# Patient Record
Sex: FEMALE | Race: WHITE | NOT HISPANIC OR LATINO | Employment: OTHER | URBAN - METROPOLITAN AREA
[De-identification: names, ages, dates, MRNs, and addresses within clinical notes are randomized per-mention and may not be internally consistent; named-entity substitution may affect disease eponyms.]

---

## 2023-10-10 ENCOUNTER — APPOINTMENT (OUTPATIENT)
Dept: PULMONOLOGY | Facility: CLINIC | Age: 73
End: 2023-10-10
Payer: MEDICARE

## 2023-11-14 ENCOUNTER — APPOINTMENT (OUTPATIENT)
Dept: PULMONOLOGY | Facility: CLINIC | Age: 73
End: 2023-11-14
Payer: MEDICARE

## 2023-11-28 ENCOUNTER — APPOINTMENT (OUTPATIENT)
Dept: PULMONOLOGY | Facility: CLINIC | Age: 73
End: 2023-11-28
Payer: MEDICARE

## 2024-02-22 ENCOUNTER — TELEPHONE (OUTPATIENT)
Dept: CARDIOLOGY | Facility: HOSPITAL | Age: 74
End: 2024-02-22
Payer: MEDICARE

## 2024-04-17 ENCOUNTER — APPOINTMENT (OUTPATIENT)
Dept: CARDIOLOGY | Facility: HOSPITAL | Age: 74
End: 2024-04-17
Payer: COMMERCIAL

## 2024-04-19 ENCOUNTER — APPOINTMENT (OUTPATIENT)
Dept: CARDIOLOGY | Facility: HOSPITAL | Age: 74
End: 2024-04-19
Payer: MEDICARE

## 2024-04-29 ENCOUNTER — APPOINTMENT (OUTPATIENT)
Dept: CARDIOLOGY | Facility: HOSPITAL | Age: 74
End: 2024-04-29
Payer: MEDICARE

## 2024-05-15 ENCOUNTER — OFFICE VISIT (OUTPATIENT)
Dept: CARDIOLOGY | Facility: HOSPITAL | Age: 74
End: 2024-05-15
Payer: MEDICARE

## 2024-05-15 VITALS
WEIGHT: 132.5 LBS | SYSTOLIC BLOOD PRESSURE: 120 MMHG | HEART RATE: 86 BPM | BODY MASS INDEX: 22.74 KG/M2 | DIASTOLIC BLOOD PRESSURE: 71 MMHG

## 2024-05-15 DIAGNOSIS — R06.02 SHORTNESS OF BREATH: ICD-10-CM

## 2024-05-15 DIAGNOSIS — I10 HYPERTENSION, UNSPECIFIED TYPE: ICD-10-CM

## 2024-05-15 DIAGNOSIS — E78.5 HYPERLIPIDEMIA, UNSPECIFIED HYPERLIPIDEMIA TYPE: Primary | ICD-10-CM

## 2024-05-15 PROCEDURE — 1159F MED LIST DOCD IN RCRD: CPT | Performed by: NURSE PRACTITIONER

## 2024-05-15 PROCEDURE — 1036F TOBACCO NON-USER: CPT | Performed by: NURSE PRACTITIONER

## 2024-05-15 PROCEDURE — 3078F DIAST BP <80 MM HG: CPT | Performed by: NURSE PRACTITIONER

## 2024-05-15 PROCEDURE — 99213 OFFICE O/P EST LOW 20 MIN: CPT | Performed by: NURSE PRACTITIONER

## 2024-05-15 PROCEDURE — 1157F ADVNC CARE PLAN IN RCRD: CPT | Performed by: NURSE PRACTITIONER

## 2024-05-15 PROCEDURE — 3074F SYST BP LT 130 MM HG: CPT | Performed by: NURSE PRACTITIONER

## 2024-05-15 RX ORDER — FLUTICASONE PROPIONATE AND SALMETEROL 250; 50 UG/1; UG/1
1 POWDER RESPIRATORY (INHALATION) EVERY 12 HOURS
COMMUNITY

## 2024-05-15 RX ORDER — GABAPENTIN 600 MG/1
600 TABLET ORAL 3 TIMES DAILY
COMMUNITY
Start: 2024-05-23 | End: 2024-08-21

## 2024-05-15 RX ORDER — IPRATROPIUM BROMIDE AND ALBUTEROL 20; 100 UG/1; UG/1
1 SPRAY, METERED RESPIRATORY (INHALATION) 4 TIMES DAILY
COMMUNITY
Start: 2021-03-16

## 2024-05-15 RX ORDER — ATORVASTATIN CALCIUM 80 MG/1
80 TABLET, FILM COATED ORAL DAILY
COMMUNITY
Start: 2023-10-19

## 2024-05-15 RX ORDER — LEVOTHYROXINE SODIUM 150 UG/1
1 TABLET ORAL
COMMUNITY
Start: 2020-10-30

## 2024-05-15 RX ORDER — CHLORZOXAZONE 500 MG/1
250-500 TABLET ORAL EVERY 8 HOURS PRN
COMMUNITY
Start: 2024-05-13 | End: 2024-08-11

## 2024-05-15 RX ORDER — RIZATRIPTAN BENZOATE 5 MG/1
5 TABLET ORAL ONCE AS NEEDED
COMMUNITY
Start: 2021-07-13

## 2024-05-15 RX ORDER — CANDESARTAN 16 MG/1
1 TABLET ORAL DAILY
COMMUNITY
Start: 2021-03-16

## 2024-05-15 RX ORDER — BUSPIRONE HYDROCHLORIDE 15 MG/1
15 TABLET ORAL 3 TIMES DAILY
COMMUNITY

## 2024-05-15 RX ORDER — FUROSEMIDE 40 MG/1
40 TABLET ORAL DAILY PRN
COMMUNITY

## 2024-05-15 ASSESSMENT — ENCOUNTER SYMPTOMS
GASTROINTESTINAL NEGATIVE: 1
DEPRESSION: 0
DYSPNEA ON EXERTION: 1
CONSTITUTIONAL NEGATIVE: 1
HEMATOLOGIC/LYMPHATIC NEGATIVE: 1
SHORTNESS OF BREATH: 1
NEUROLOGICAL NEGATIVE: 1
PSYCHIATRIC NEGATIVE: 1
MUSCLE CRAMPS: 1
ENDOCRINE NEGATIVE: 1
EYES NEGATIVE: 1

## 2024-05-15 NOTE — PROGRESS NOTES
Referred by Dr. Valdovinos ref. provider found for No chief complaint on file.     History Of Present Illness:    Luis Armando Goss is a very pleasant 73 year old female with a history of HTN, HLD and COPD, she is here for a follow up visit. The patient is seen in collaboration with Dr. Aguilar. Mrs. Goss was in the hospital in February, she had several falls and had back surgery. Complains of right leg pain, following up with pain management. She complains of shortness of breath, pulse ox has been low at home. She has had dizziness with any exertion. Complains of chest pain, pain in her neck.     Review of Systems   Constitutional: Negative.   HENT: Negative.     Eyes: Negative.    Cardiovascular:  Positive for chest pain and dyspnea on exertion.   Respiratory:  Positive for shortness of breath.    Endocrine: Negative.    Hematologic/Lymphatic: Negative.    Skin: Negative.    Musculoskeletal:  Positive for muscle cramps and muscle weakness.   Gastrointestinal: Negative.    Neurological: Negative.    Psychiatric/Behavioral: Negative.        Past Medical History:  She has no past medical history on file.    Past Surgical History:  She has a past surgical history that includes Appendectomy (07/06/2017); Back surgery (07/06/2017); CT angio head w and wo IV contrast (1/19/2023); and CT angio neck (1/19/2023).      Social History:  She reports that she has quit smoking. Her smoking use included cigarettes. She has never used smokeless tobacco. She reports that she does not currently use alcohol. She reports that she does not use drugs.    Family History:  No family history on file.     Allergies:  Patient has no allergy information on record.    Outpatient Medications:  No current outpatient medications     Last Recorded Vitals:  There were no vitals filed for this visit.    Physical Exam:  Physical Exam  Vitals reviewed.   HENT:      Head: Normocephalic.      Nose: Nose normal.   Eyes:      Pupils: Pupils are equal, round, and  reactive to light.   Cardiovascular:      Rate and Rhythm: Normal rate and regular rhythm. 3/6 VOLODYMYR   Pulmonary:      Effort: Pulmonary effort is normal.      Breath sounds: Normal breath sounds.   Abdominal:      General: Abdomen is flat.      Palpations: Abdomen is soft.   Musculoskeletal:         General: Normal range of motion.      Cervical back: Normal range of motion.   Skin:     General: Skin is warm and dry.   Neurological:      General: No focal deficit present.      Mental Status: He is alert and oriented to person, place, and time.   Psychiatric:         Mood and Affect: Mood normal.            Last Labs:  CBC -  Lab Results   Component Value Date    WBC 8.6 06/21/2023    HGB 11.9 (L) 06/21/2023    HCT 38.7 06/21/2023    MCV 86 06/21/2023     06/21/2023       CMP -  Lab Results   Component Value Date    CALCIUM 9.1 06/21/2023    PHOS 2.0 (L) 01/30/2023    PROT 5.6 (L) 01/27/2023    ALBUMIN 3.2 (L) 01/30/2023    ALBUMIN 2.8 (L) 09/27/2021    AST 27 01/27/2023    ALT 16 01/27/2023    ALKPHOS 29 (L) 01/27/2023    BILITOT 0.4 01/27/2023       LIPID PANEL -   Lab Results   Component Value Date    CHOL 85 01/19/2023    TRIG 87 01/19/2023    HDL 29.6 (A) 01/19/2023    CHHDL 2.9 01/19/2023    LDLF 38 01/19/2023    VLDL 17 01/19/2023       RENAL FUNCTION PANEL -   Lab Results   Component Value Date    GLUCOSE 92 06/21/2023     06/21/2023    K 4.1 06/21/2023     06/21/2023    CO2 27 06/21/2023    ANIONGAP 10 06/21/2023    BUN 18 06/21/2023    CREATININE 0.99 06/21/2023    CALCIUM 9.1 06/21/2023    PHOS 2.0 (L) 01/30/2023    ALBUMIN 3.2 (L) 01/30/2023    ALBUMIN 2.8 (L) 09/27/2021        Lab Results   Component Value Date    BNP 67 01/17/2023    HGBA1C 5.9 (H) 02/10/2024       Last Cardiology Tests:  ECG:    Echo:  Echocardiogram 1/18/2023  1. Left ventricular systolic function is normal with a 60-65% estimated ejection fraction.  2. Spectral Doppler shows an impaired relaxation pattern of left  ventricular diastolic filling.  3. Mild to moderate aortic valve stenosis.  4. Moderate aortic valve regurgitation.  5. There is plaque visualized in the ascending aorta.  6. The tricuspid regurgitant velocity is 3.18 m/s, and with an estimated right atrial pressure of 3 mmHg, the estimated pulmonary artery pressure is moderately elevated with the RVSP at 43.4 mmHg.    Echo 10/26/17:   1. The left ventricular systolic function is low normal with a 55% estimated  ejection fraction.   2. Spectral Doppler shows an impaired relaxation pattern of left ventricular  diastolic filling.   3. Mild aortic valve stenosis.   4. There is moderate aortic valve regurgitation.   5. There is mild dilatation of the ascending aorta (3.9cm).   6. There is mild mitral, tricuspid, and pulmonic regurgitation   Ejection Fractions:  LVEF 65%  Cath:  Cardiac cath 11/16/2018   1. Left main: no significant angiograhpic disease.   2. LAD: minimal diffuse luminal irregularities.   3. LCx: 30% proximal stenosis.   4. RCA: 50% tubular mid-vessel stenosis.   5. LVEDP 11mmHg, no aortic stenosis on LV-AO gradient.   6. 2+ aortic regurgitation by aortography.  Stress Test:  Nuclear Stress Test 07/20/2023  1. Normal myocardial perfusion study without evidence of ischemia or  prior infarction.  2. The left ventricle is normal in size.  3. Normal LV wall motion with an LV EF estimated at greater than 65%.     Cardiac Imaging:  CTA 7/26/2018:  1. Stable mild fusiform aneurysmal dilatation of the ascending  thoracic aorta measuring up to 4.2 cm. Rest of thoracic aorta is  normal in course and caliber no evidence of acute aortic pathology.  2. 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  3. Redemonstration of severe centrilobular emphysematous changes.  4. Stable appearance of multiple small bilateral noncalcified  pulmonary nodules/nodular densities. Of note, the  previously seen 8  mm lingular nodular density is also unchanged and likely felt to  represent scarring.    Assessment/Plan   Very pleasant 73-year-old female with hypertension, dyslipidemia, mild ascending aortic aneurysm/mod AI ,COPD, hypothyroidism, she complains of exertional dyspnea and dizziness. Nuclear stress test was negative for ischemia. She is euvolemic on exam.  She will be follow up with pulmonary, pulse ox was low normal today. Heart rate and blood pressure are well controlled today.      Plan   -call with any questions   -follow up with pulmonary   -daily weights   -monitor sodium and fluid intake   -take Lasix as needed   -follow up in October         Ashley Hurtado, APRN-CNP

## 2024-05-15 NOTE — PATIENT INSTRUCTIONS
CALL WITH ANY QUESTIONS   NO MEDICATION CHANGES   FOLLOW UP IN OCTOBER   FOLLOW UP WITH PULMONARY

## 2024-10-15 ENCOUNTER — APPOINTMENT (OUTPATIENT)
Dept: CARDIOLOGY | Facility: CLINIC | Age: 74
End: 2024-10-15
Payer: MEDICARE

## 2025-04-22 ENCOUNTER — NURSING HOME VISIT (OUTPATIENT)
Dept: POST ACUTE CARE | Facility: EXTERNAL LOCATION | Age: 75
End: 2025-04-22
Payer: MEDICARE

## 2025-04-22 DIAGNOSIS — I10 PRIMARY HYPERTENSION: ICD-10-CM

## 2025-04-22 DIAGNOSIS — J44.9 CHRONIC OBSTRUCTIVE PULMONARY DISEASE, UNSPECIFIED COPD TYPE (MULTI): ICD-10-CM

## 2025-04-22 DIAGNOSIS — S42.355D CLOSED NONDISPLACED COMMINUTED FRACTURE OF SHAFT OF LEFT HUMERUS WITH ROUTINE HEALING, SUBSEQUENT ENCOUNTER: ICD-10-CM

## 2025-04-22 DIAGNOSIS — J96.11 CHRONIC RESPIRATORY FAILURE WITH HYPOXIA: ICD-10-CM

## 2025-04-22 DIAGNOSIS — R16.0 LIVER MASSES: ICD-10-CM

## 2025-04-22 DIAGNOSIS — R91.8 LUNG NODULES: ICD-10-CM

## 2025-04-22 DIAGNOSIS — R26.2 DIFFICULTY IN WALKING: Primary | ICD-10-CM

## 2025-04-22 DIAGNOSIS — K21.9 GERD WITHOUT ESOPHAGITIS: ICD-10-CM

## 2025-04-22 DIAGNOSIS — E03.9 HYPOTHYROIDISM (ACQUIRED): ICD-10-CM

## 2025-04-22 DIAGNOSIS — E11.9 DIABETES MELLITUS WITHOUT COMPLICATION: ICD-10-CM

## 2025-04-22 PROCEDURE — 99306 1ST NF CARE HIGH MDM 50: CPT | Performed by: INTERNAL MEDICINE

## 2025-04-22 NOTE — LETTER
Patient: Luis Armando Goss  : 1950    Encounter Date: 2025    Subjective  Patient ID: Luis Armando Goss is a 74 y.o. female who is acute skilled care being seen and evaluated for multiple medical problems.    Admission H&P: Was hospitalized for fall with comminuted left humeral fracture, treated conservatively with shoulder immobilizer.  CT abdomen showed multiple liver masses and lung nodules suspicious of malignancy.  She underwent liver biopsy, results are pending.  She is alert, anxious, she is complaining of left shoulder pain and shortness of breath requiring chronic oxygen by nasal cannula.  She requires assistance with transfers and ambulation         Review of Systems   Constitutional:  Negative for fever and unexpected weight change.   HENT:  Negative for sore throat.    Respiratory:  Positive for shortness of breath. Negative for cough.    Cardiovascular:  Negative for chest pain and palpitations.   Gastrointestinal:  Negative for abdominal pain, constipation, diarrhea, nausea and vomiting.   Genitourinary:  Negative for difficulty urinating and frequency.   Musculoskeletal:  Positive for arthralgias. Negative for back pain.   Skin:  Negative for rash.   Neurological:  Negative for dizziness, seizures and headaches.   Psychiatric/Behavioral:  Negative for agitation. The patient is nervous/anxious.        Objective  There were no vitals taken for this visit.    Physical Exam  Vitals reviewed.   Constitutional:       General: She is not in acute distress.  HENT:      Mouth/Throat:      Mouth: Mucous membranes are moist.   Cardiovascular:      Rate and Rhythm: Regular rhythm.      Heart sounds: Normal heart sounds.   Pulmonary:      Breath sounds: Normal breath sounds. No rales.   Abdominal:      Palpations: Abdomen is soft.      Tenderness: There is no abdominal tenderness.   Musculoskeletal:         General: No tenderness (With attempt to range of motion of left shoulder, immobilized in a brace).       Cervical back: Neck supple. No tenderness.   Skin:     General: Skin is warm.   Neurological:      General: No focal deficit present.      Mental Status: She is alert.         Assessment/Plan  Diagnoses and all orders for this visit:  Difficulty in walking  Chronic obstructive pulmonary disease, unspecified COPD type (Multi)  Primary hypertension  GERD without esophagitis  Hypothyroidism (acquired)  Lung nodules  Liver masses  Closed nondisplaced comminuted fracture of shaft of left humerus with routine healing, subsequent encounter  Diabetes mellitus without complication  Chronic respiratory failure with hypoxia       Goals    None     PT and OT evaluation, fall precautions.  Will check labs.  She has appointment to follow-up with the pulmonologist regarding the lung nodules.  Will follow-up also on the liver biopsy results    Electronically Signed By: Rosmery Morrison MD   6/10/25  8:26 PM

## 2025-04-25 ENCOUNTER — NURSING HOME VISIT (OUTPATIENT)
Dept: POST ACUTE CARE | Facility: EXTERNAL LOCATION | Age: 75
End: 2025-04-25
Payer: MEDICARE

## 2025-04-25 DIAGNOSIS — S42.355D CLOSED NONDISPLACED COMMINUTED FRACTURE OF SHAFT OF LEFT HUMERUS WITH ROUTINE HEALING, SUBSEQUENT ENCOUNTER: Primary | ICD-10-CM

## 2025-04-25 DIAGNOSIS — E03.9 HYPOTHYROIDISM (ACQUIRED): ICD-10-CM

## 2025-04-25 DIAGNOSIS — I11.0 HYPERTENSIVE HEART DISEASE WITH HEART FAILURE: ICD-10-CM

## 2025-04-25 DIAGNOSIS — R91.8 LUNG NODULES: ICD-10-CM

## 2025-04-25 PROCEDURE — 99308 SBSQ NF CARE LOW MDM 20: CPT | Performed by: INTERNAL MEDICINE

## 2025-04-25 NOTE — LETTER
Patient: Luis Armando Goss  : 1950    Encounter Date: 2025    Subjective  Patient ID: Luis Armando Goss is a 74 y.o. female who is acute skilled care being seen and evaluated for multiple medical problems.     She is alert, anxious, she is complaining of left shoulder pain, she states the brace aggravates her pain more.  Her appetite is fair.  She uses oxygen by nasal cannula.  She requires assistance with transfers and ambulation         Review of Systems   Constitutional:  Negative for fever and unexpected weight change.   HENT:  Negative for sore throat.    Respiratory:  Positive for shortness of breath. Negative for cough.    Cardiovascular:  Negative for chest pain and palpitations.   Gastrointestinal:  Negative for abdominal pain, constipation, diarrhea, nausea and vomiting.   Genitourinary:  Negative for difficulty urinating and frequency.   Musculoskeletal:  Positive for arthralgias. Negative for back pain.   Skin:  Negative for rash.   Neurological:  Negative for dizziness, seizures and headaches.   Psychiatric/Behavioral:  Negative for agitation. The patient is nervous/anxious.        Objective  There were no vitals taken for this visit.    Physical Exam  Vitals reviewed.   Constitutional:       General: She is not in acute distress.  HENT:      Mouth/Throat:      Mouth: Mucous membranes are moist.   Cardiovascular:      Rate and Rhythm: Regular rhythm.      Heart sounds: Normal heart sounds.   Pulmonary:      Breath sounds: Normal breath sounds. No rales.   Abdominal:      Palpations: Abdomen is soft.      Tenderness: There is no abdominal tenderness.   Musculoskeletal:         General: No tenderness (With attempt to range of motion of left shoulder, immobilized in a brace).      Cervical back: Neck supple. No tenderness.   Skin:     General: Skin is warm.   Neurological:      General: No focal deficit present.      Mental Status: She is alert.         Assessment/Plan  Diagnoses and all orders  for this visit:  Closed nondisplaced comminuted fracture of shaft of left humerus with routine healing, subsequent encounter  Hypothyroidism (acquired)  Lung nodules         Goals    None     Will continue PT and OT, fall precautions.      Electronically Signed By: Rosmery Morrison MD   6/10/25  9:15 PM

## 2025-04-29 ENCOUNTER — NURSING HOME VISIT (OUTPATIENT)
Dept: POST ACUTE CARE | Facility: EXTERNAL LOCATION | Age: 75
End: 2025-04-29
Payer: MEDICARE

## 2025-04-29 DIAGNOSIS — S42.355D CLOSED NONDISPLACED COMMINUTED FRACTURE OF SHAFT OF LEFT HUMERUS WITH ROUTINE HEALING, SUBSEQUENT ENCOUNTER: ICD-10-CM

## 2025-04-29 DIAGNOSIS — E03.9 HYPOTHYROIDISM (ACQUIRED): ICD-10-CM

## 2025-04-29 DIAGNOSIS — D3A.8 NEUROENDOCRINE TUMOR OF LIVER: Primary | ICD-10-CM

## 2025-04-29 PROCEDURE — 99308 SBSQ NF CARE LOW MDM 20: CPT | Performed by: INTERNAL MEDICINE

## 2025-04-29 NOTE — LETTER
Patient: Luis Armando Goss  : 1950    Encounter Date: 2025    Subjective  Patient ID: Luis Armando Goss is a 74 y.o. female who is acute skilled care being seen and evaluated for multiple medical problems.     She is alert, anxious, she is complaining of left shoulder pain.  Her appetite is fair.  She uses oxygen by nasal cannula.  She requires assistance with transfers and ambulation.  Liver biopsy pathology report revealed neuroendocrine tumor.  She has appointment to see oncologist         Review of Systems   Constitutional:  Negative for fever and unexpected weight change.   HENT:  Negative for sore throat.    Respiratory:  Positive for shortness of breath. Negative for cough.    Cardiovascular:  Negative for chest pain and palpitations.   Gastrointestinal:  Negative for abdominal pain, constipation, diarrhea, nausea and vomiting.   Genitourinary:  Negative for difficulty urinating and frequency.   Musculoskeletal:  Positive for arthralgias. Negative for back pain.   Skin:  Negative for rash.   Neurological:  Negative for dizziness, seizures and headaches.   Psychiatric/Behavioral:  Negative for agitation. The patient is nervous/anxious.        Objective  There were no vitals taken for this visit.    Physical Exam  Vitals reviewed.   Constitutional:       General: She is not in acute distress.  HENT:      Mouth/Throat:      Mouth: Mucous membranes are moist.   Cardiovascular:      Rate and Rhythm: Regular rhythm.      Heart sounds: Normal heart sounds.   Pulmonary:      Breath sounds: Normal breath sounds. No rales.   Abdominal:      Palpations: Abdomen is soft.      Tenderness: There is no abdominal tenderness.   Musculoskeletal:         General: No tenderness (With attempt to range of motion of left shoulder, immobilized in a brace).      Cervical back: Neck supple. No tenderness.   Skin:     General: Skin is warm.   Neurological:      General: No focal deficit present.      Mental Status: She is  alert.         Assessment/Plan  Diagnoses and all orders for this visit:  Neuroendocrine tumor of liver  Closed nondisplaced comminuted fracture of shaft of left humerus with routine healing, subsequent encounter  Hypothyroidism (acquired)           Goals    None     Will continue PT and OT, fall precautions.  Follow-up with oncology    Electronically Signed By: Rosmery Morrison MD   6/10/25 10:35 PM

## 2025-05-02 ENCOUNTER — NURSING HOME VISIT (OUTPATIENT)
Dept: POST ACUTE CARE | Facility: EXTERNAL LOCATION | Age: 75
End: 2025-05-02
Payer: MEDICARE

## 2025-05-02 DIAGNOSIS — I11.0 HYPERTENSIVE HEART DISEASE WITH HEART FAILURE: Primary | ICD-10-CM

## 2025-05-02 DIAGNOSIS — J44.9 CHRONIC OBSTRUCTIVE PULMONARY DISEASE, UNSPECIFIED COPD TYPE (MULTI): ICD-10-CM

## 2025-05-02 DIAGNOSIS — S42.355D CLOSED NONDISPLACED COMMINUTED FRACTURE OF SHAFT OF LEFT HUMERUS WITH ROUTINE HEALING, SUBSEQUENT ENCOUNTER: ICD-10-CM

## 2025-05-02 PROCEDURE — 99308 SBSQ NF CARE LOW MDM 20: CPT | Performed by: INTERNAL MEDICINE

## 2025-05-02 NOTE — LETTER
Patient: Luis Armando Goss  : 1950    Encounter Date: 2025    Subjective  Patient ID: Luis Armando Goss is a 74 y.o. female who is acute skilled care being seen and evaluated for multiple medical problems.     She is alert, pleasant, denies SOB,  she is complaining of left shoulder pain.  Her appetite is fair.  She ambulates with walker. She has upcoming appointment with orthopedics.          Review of Systems   Constitutional:  Negative for fever and unexpected weight change.   HENT:  Negative for sore throat.    Respiratory:  Negative for cough and shortness of breath.    Cardiovascular:  Negative for chest pain and palpitations.   Gastrointestinal:  Negative for abdominal pain, constipation, diarrhea, nausea and vomiting.   Genitourinary:  Negative for difficulty urinating and frequency.   Musculoskeletal:  Positive for arthralgias. Negative for back pain.   Skin:  Negative for rash.   Neurological:  Negative for dizziness, seizures and headaches.   Psychiatric/Behavioral:  Negative for agitation. The patient is nervous/anxious.        Objective  There were no vitals taken for this visit.    Physical Exam  Vitals reviewed.   Constitutional:       General: She is not in acute distress.  HENT:      Mouth/Throat:      Mouth: Mucous membranes are moist.   Cardiovascular:      Rate and Rhythm: Regular rhythm.      Heart sounds: Normal heart sounds.   Pulmonary:      Breath sounds: Normal breath sounds. No rales.   Abdominal:      Palpations: Abdomen is soft.      Tenderness: There is no abdominal tenderness.   Musculoskeletal:         General: No tenderness (With attempt to range of motion of left shoulder, immobilized in a brace).      Cervical back: Neck supple. No tenderness.   Skin:     General: Skin is warm.   Neurological:      General: No focal deficit present.      Mental Status: She is alert.         Assessment/Plan  Diagnoses and all orders for this visit:  Hypertensive heart disease with heart  failure  Closed nondisplaced comminuted fracture of shaft of left humerus with routine healing, subsequent encounter  Chronic obstructive pulmonary disease, unspecified COPD type (Multi)             Goals    None     Will continue PT and OT, fall precautions.  Follow-up with oncology and orthopedics    Electronically Signed By: Rosmery Morrison MD   6/17/25 11:30 PM

## 2025-05-06 ENCOUNTER — NURSING HOME VISIT (OUTPATIENT)
Dept: POST ACUTE CARE | Facility: EXTERNAL LOCATION | Age: 75
End: 2025-05-06
Payer: MEDICARE

## 2025-05-06 DIAGNOSIS — S42.355D CLOSED NONDISPLACED COMMINUTED FRACTURE OF SHAFT OF LEFT HUMERUS WITH ROUTINE HEALING, SUBSEQUENT ENCOUNTER: ICD-10-CM

## 2025-05-06 DIAGNOSIS — J44.9 CHRONIC OBSTRUCTIVE PULMONARY DISEASE, UNSPECIFIED COPD TYPE (MULTI): ICD-10-CM

## 2025-05-06 DIAGNOSIS — D3A.8 NEUROENDOCRINE TUMOR OF LIVER: Primary | ICD-10-CM

## 2025-05-06 PROCEDURE — 99309 SBSQ NF CARE MODERATE MDM 30: CPT | Performed by: INTERNAL MEDICINE

## 2025-05-06 NOTE — LETTER
Patient: Luis Armando Goss  : 1950    Encounter Date: 2025    Subjective  Patient ID: Luis Armando Goss is a 74 y.o. female who is acute skilled care being seen and evaluated for multiple medical problems.     She is alert, pleasant, denies SOB,  she is complaining of left shoulder pain.  Her appetite is fair.  She ambulates with walker. She had appointment with orthopedics and x-ray revealed a spiral humeral fracture with proper alignment, her brace was changed to a Bailey brace and arm sling.  She will have port placed to start chemotherapy.          Review of Systems   Constitutional:  Negative for fever and unexpected weight change.   HENT:  Negative for sore throat.    Respiratory:  Negative for cough and shortness of breath.    Cardiovascular:  Negative for chest pain and palpitations.   Gastrointestinal:  Negative for abdominal pain, constipation, diarrhea, nausea and vomiting.   Genitourinary:  Negative for difficulty urinating and frequency.   Musculoskeletal:  Positive for arthralgias. Negative for back pain.   Skin:  Negative for rash.   Neurological:  Negative for dizziness, seizures and headaches.   Psychiatric/Behavioral:  Negative for agitation. The patient is nervous/anxious.        Objective  There were no vitals taken for this visit.    Physical Exam  Vitals reviewed.   Constitutional:       General: She is not in acute distress.  HENT:      Mouth/Throat:      Mouth: Mucous membranes are moist.   Cardiovascular:      Rate and Rhythm: Regular rhythm.      Heart sounds: Normal heart sounds.   Pulmonary:      Breath sounds: Normal breath sounds. No rales.   Abdominal:      Palpations: Abdomen is soft.      Tenderness: There is no abdominal tenderness.   Musculoskeletal:         General: No tenderness (With attempt to range of motion of left shoulder, immobilized in a brace).      Cervical back: Neck supple. No tenderness.   Skin:     General: Skin is warm.   Neurological:      General: No  focal deficit present.      Mental Status: She is alert.         Assessment/Plan  Diagnoses and all orders for this visit:  Neuroendocrine tumor of liver  Chronic obstructive pulmonary disease, unspecified COPD type (Multi)  Closed nondisplaced comminuted fracture of shaft of left humerus with routine healing, subsequent encounter               Goals    None     Will continue PT and OT, pain management, fall precautions.  Follow-up with oncology and orthopedics    Electronically Signed By: Rosmery Morrison MD   6/19/25  8:25 AM

## 2025-06-10 PROBLEM — I11.0 HYPERTENSIVE HEART DISEASE WITH HEART FAILURE: Status: ACTIVE | Noted: 2025-06-10

## 2025-06-10 ASSESSMENT — ENCOUNTER SYMPTOMS
FEVER: 0
PALPITATIONS: 0
NERVOUS/ANXIOUS: 1
UNEXPECTED WEIGHT CHANGE: 0
CONSTIPATION: 0
NERVOUS/ANXIOUS: 1
ARTHRALGIAS: 1
COUGH: 0
HEADACHES: 0
ARTHRALGIAS: 1
VOMITING: 0
ABDOMINAL PAIN: 0
FREQUENCY: 0
ABDOMINAL PAIN: 0
SHORTNESS OF BREATH: 1
VOMITING: 0
COUGH: 0
PALPITATIONS: 0
SEIZURES: 0
BACK PAIN: 0
VOMITING: 0
DIZZINESS: 0
HEADACHES: 0
SORE THROAT: 0
SORE THROAT: 0
PALPITATIONS: 0
SHORTNESS OF BREATH: 1
FEVER: 0
BACK PAIN: 0
NAUSEA: 0
DIFFICULTY URINATING: 0
COUGH: 0
FREQUENCY: 0
DIARRHEA: 0
FEVER: 0
SEIZURES: 0
ABDOMINAL PAIN: 0
AGITATION: 0
SORE THROAT: 0
DIZZINESS: 0
CONSTIPATION: 0
CONSTIPATION: 0
SEIZURES: 0
UNEXPECTED WEIGHT CHANGE: 0
DIARRHEA: 0
BACK PAIN: 0
FREQUENCY: 0
UNEXPECTED WEIGHT CHANGE: 0
NERVOUS/ANXIOUS: 1
DIFFICULTY URINATING: 0
HEADACHES: 0
AGITATION: 0
DIFFICULTY URINATING: 0
DIZZINESS: 0
AGITATION: 0
NAUSEA: 0
ARTHRALGIAS: 1
SHORTNESS OF BREATH: 1
NAUSEA: 0
DIARRHEA: 0

## 2025-06-11 NOTE — PROGRESS NOTES
Subjective   Patient ID: Luis Armando Goss is a 74 y.o. female who is acute skilled care being seen and evaluated for multiple medical problems.    Admission H&P: Was hospitalized for fall with comminuted left humeral fracture, treated conservatively with shoulder immobilizer.  CT abdomen showed multiple liver masses and lung nodules suspicious of malignancy.  She underwent liver biopsy, results are pending.  She is alert, anxious, she is complaining of left shoulder pain and shortness of breath requiring chronic oxygen by nasal cannula.  She requires assistance with transfers and ambulation         Review of Systems   Constitutional:  Negative for fever and unexpected weight change.   HENT:  Negative for sore throat.    Respiratory:  Positive for shortness of breath. Negative for cough.    Cardiovascular:  Negative for chest pain and palpitations.   Gastrointestinal:  Negative for abdominal pain, constipation, diarrhea, nausea and vomiting.   Genitourinary:  Negative for difficulty urinating and frequency.   Musculoskeletal:  Positive for arthralgias. Negative for back pain.   Skin:  Negative for rash.   Neurological:  Negative for dizziness, seizures and headaches.   Psychiatric/Behavioral:  Negative for agitation. The patient is nervous/anxious.        Objective   There were no vitals taken for this visit.    Physical Exam  Vitals reviewed.   Constitutional:       General: She is not in acute distress.  HENT:      Mouth/Throat:      Mouth: Mucous membranes are moist.   Cardiovascular:      Rate and Rhythm: Regular rhythm.      Heart sounds: Normal heart sounds.   Pulmonary:      Breath sounds: Normal breath sounds. No rales.   Abdominal:      Palpations: Abdomen is soft.      Tenderness: There is no abdominal tenderness.   Musculoskeletal:         General: No tenderness (With attempt to range of motion of left shoulder, immobilized in a brace).      Cervical back: Neck supple. No tenderness.   Skin:     General:  Skin is warm.   Neurological:      General: No focal deficit present.      Mental Status: She is alert.         Assessment/Plan   Diagnoses and all orders for this visit:  Difficulty in walking  Chronic obstructive pulmonary disease, unspecified COPD type (Multi)  Primary hypertension  GERD without esophagitis  Hypothyroidism (acquired)  Lung nodules  Liver masses  Closed nondisplaced comminuted fracture of shaft of left humerus with routine healing, subsequent encounter  Diabetes mellitus without complication  Chronic respiratory failure with hypoxia       Goals    None     PT and OT evaluation, fall precautions.  Will check labs.  She has appointment to follow-up with the pulmonologist regarding the lung nodules.  Will follow-up also on the liver biopsy results

## 2025-06-11 NOTE — PROGRESS NOTES
Subjective   Patient ID: Luis Armando Goss is a 74 y.o. female who is acute skilled care being seen and evaluated for multiple medical problems.     She is alert, anxious, she is complaining of left shoulder pain, she states the brace aggravates her pain more.  Her appetite is fair.  She uses oxygen by nasal cannula.  She requires assistance with transfers and ambulation         Review of Systems   Constitutional:  Negative for fever and unexpected weight change.   HENT:  Negative for sore throat.    Respiratory:  Positive for shortness of breath. Negative for cough.    Cardiovascular:  Negative for chest pain and palpitations.   Gastrointestinal:  Negative for abdominal pain, constipation, diarrhea, nausea and vomiting.   Genitourinary:  Negative for difficulty urinating and frequency.   Musculoskeletal:  Positive for arthralgias. Negative for back pain.   Skin:  Negative for rash.   Neurological:  Negative for dizziness, seizures and headaches.   Psychiatric/Behavioral:  Negative for agitation. The patient is nervous/anxious.        Objective   There were no vitals taken for this visit.    Physical Exam  Vitals reviewed.   Constitutional:       General: She is not in acute distress.  HENT:      Mouth/Throat:      Mouth: Mucous membranes are moist.   Cardiovascular:      Rate and Rhythm: Regular rhythm.      Heart sounds: Normal heart sounds.   Pulmonary:      Breath sounds: Normal breath sounds. No rales.   Abdominal:      Palpations: Abdomen is soft.      Tenderness: There is no abdominal tenderness.   Musculoskeletal:         General: No tenderness (With attempt to range of motion of left shoulder, immobilized in a brace).      Cervical back: Neck supple. No tenderness.   Skin:     General: Skin is warm.   Neurological:      General: No focal deficit present.      Mental Status: She is alert.         Assessment/Plan   Diagnoses and all orders for this visit:  Closed nondisplaced comminuted fracture of shaft of  left humerus with routine healing, subsequent encounter  Hypothyroidism (acquired)  Lung nodules         Goals    None     Will continue PT and OT, fall precautions.

## 2025-06-11 NOTE — PROGRESS NOTES
Subjective   Patient ID: Luis Armando Goss is a 74 y.o. female who is acute skilled care being seen and evaluated for multiple medical problems.     She is alert, anxious, she is complaining of left shoulder pain.  Her appetite is fair.  She uses oxygen by nasal cannula.  She requires assistance with transfers and ambulation.  Liver biopsy pathology report revealed neuroendocrine tumor.  She has appointment to see oncologist         Review of Systems   Constitutional:  Negative for fever and unexpected weight change.   HENT:  Negative for sore throat.    Respiratory:  Positive for shortness of breath. Negative for cough.    Cardiovascular:  Negative for chest pain and palpitations.   Gastrointestinal:  Negative for abdominal pain, constipation, diarrhea, nausea and vomiting.   Genitourinary:  Negative for difficulty urinating and frequency.   Musculoskeletal:  Positive for arthralgias. Negative for back pain.   Skin:  Negative for rash.   Neurological:  Negative for dizziness, seizures and headaches.   Psychiatric/Behavioral:  Negative for agitation. The patient is nervous/anxious.        Objective   There were no vitals taken for this visit.    Physical Exam  Vitals reviewed.   Constitutional:       General: She is not in acute distress.  HENT:      Mouth/Throat:      Mouth: Mucous membranes are moist.   Cardiovascular:      Rate and Rhythm: Regular rhythm.      Heart sounds: Normal heart sounds.   Pulmonary:      Breath sounds: Normal breath sounds. No rales.   Abdominal:      Palpations: Abdomen is soft.      Tenderness: There is no abdominal tenderness.   Musculoskeletal:         General: No tenderness (With attempt to range of motion of left shoulder, immobilized in a brace).      Cervical back: Neck supple. No tenderness.   Skin:     General: Skin is warm.   Neurological:      General: No focal deficit present.      Mental Status: She is alert.         Assessment/Plan   Diagnoses and all orders for this  visit:  Neuroendocrine tumor of liver  Closed nondisplaced comminuted fracture of shaft of left humerus with routine healing, subsequent encounter  Hypothyroidism (acquired)           Goals    None     Will continue PT and OT, fall precautions.  Follow-up with oncology

## 2025-06-17 ASSESSMENT — ENCOUNTER SYMPTOMS
DIARRHEA: 0
SHORTNESS OF BREATH: 0
PALPITATIONS: 0
ABDOMINAL PAIN: 0
AGITATION: 0
HEADACHES: 0
FREQUENCY: 0
FEVER: 0
ARTHRALGIAS: 1
NAUSEA: 0
SORE THROAT: 0
DIFFICULTY URINATING: 0
UNEXPECTED WEIGHT CHANGE: 0
BACK PAIN: 0
NERVOUS/ANXIOUS: 1
VOMITING: 0
COUGH: 0
DIZZINESS: 0
CONSTIPATION: 0
SEIZURES: 0

## 2025-06-18 NOTE — PROGRESS NOTES
Subjective   Patient ID: Luis Armando Goss is a 74 y.o. female who is acute skilled care being seen and evaluated for multiple medical problems.     She is alert, pleasant, denies SOB,  she is complaining of left shoulder pain.  Her appetite is fair.  She ambulates with walker. She has upcoming appointment with orthopedics.          Review of Systems   Constitutional:  Negative for fever and unexpected weight change.   HENT:  Negative for sore throat.    Respiratory:  Negative for cough and shortness of breath.    Cardiovascular:  Negative for chest pain and palpitations.   Gastrointestinal:  Negative for abdominal pain, constipation, diarrhea, nausea and vomiting.   Genitourinary:  Negative for difficulty urinating and frequency.   Musculoskeletal:  Positive for arthralgias. Negative for back pain.   Skin:  Negative for rash.   Neurological:  Negative for dizziness, seizures and headaches.   Psychiatric/Behavioral:  Negative for agitation. The patient is nervous/anxious.        Objective   There were no vitals taken for this visit.    Physical Exam  Vitals reviewed.   Constitutional:       General: She is not in acute distress.  HENT:      Mouth/Throat:      Mouth: Mucous membranes are moist.   Cardiovascular:      Rate and Rhythm: Regular rhythm.      Heart sounds: Normal heart sounds.   Pulmonary:      Breath sounds: Normal breath sounds. No rales.   Abdominal:      Palpations: Abdomen is soft.      Tenderness: There is no abdominal tenderness.   Musculoskeletal:         General: No tenderness (With attempt to range of motion of left shoulder, immobilized in a brace).      Cervical back: Neck supple. No tenderness.   Skin:     General: Skin is warm.   Neurological:      General: No focal deficit present.      Mental Status: She is alert.         Assessment/Plan   Diagnoses and all orders for this visit:  Hypertensive heart disease with heart failure  Closed nondisplaced comminuted fracture of shaft of left humerus  with routine healing, subsequent encounter  Chronic obstructive pulmonary disease, unspecified COPD type (Multi)             Goals    None     Will continue PT and OT, fall precautions.  Follow-up with oncology and orthopedics

## 2025-06-19 ASSESSMENT — ENCOUNTER SYMPTOMS
UNEXPECTED WEIGHT CHANGE: 0
ABDOMINAL PAIN: 0
ARTHRALGIAS: 1
NERVOUS/ANXIOUS: 1
DIZZINESS: 0
PALPITATIONS: 0
COUGH: 0
NAUSEA: 0
DIFFICULTY URINATING: 0
AGITATION: 0
FEVER: 0
BACK PAIN: 0
FREQUENCY: 0
HEADACHES: 0
SORE THROAT: 0
VOMITING: 0
SHORTNESS OF BREATH: 0
SEIZURES: 0
DIARRHEA: 0
CONSTIPATION: 0

## 2025-06-19 NOTE — PROGRESS NOTES
Subjective   Patient ID: Luis Armando Goss is a 74 y.o. female who is acute skilled care being seen and evaluated for multiple medical problems.     She is alert, pleasant, denies SOB,  she is complaining of left shoulder pain.  Her appetite is fair.  She ambulates with walker. She had appointment with orthopedics and x-ray revealed a spiral humeral fracture with proper alignment, her brace was changed to a Bailey brace and arm sling.  She will have port placed to start chemotherapy.          Review of Systems   Constitutional:  Negative for fever and unexpected weight change.   HENT:  Negative for sore throat.    Respiratory:  Negative for cough and shortness of breath.    Cardiovascular:  Negative for chest pain and palpitations.   Gastrointestinal:  Negative for abdominal pain, constipation, diarrhea, nausea and vomiting.   Genitourinary:  Negative for difficulty urinating and frequency.   Musculoskeletal:  Positive for arthralgias. Negative for back pain.   Skin:  Negative for rash.   Neurological:  Negative for dizziness, seizures and headaches.   Psychiatric/Behavioral:  Negative for agitation. The patient is nervous/anxious.        Objective   There were no vitals taken for this visit.    Physical Exam  Vitals reviewed.   Constitutional:       General: She is not in acute distress.  HENT:      Mouth/Throat:      Mouth: Mucous membranes are moist.   Cardiovascular:      Rate and Rhythm: Regular rhythm.      Heart sounds: Normal heart sounds.   Pulmonary:      Breath sounds: Normal breath sounds. No rales.   Abdominal:      Palpations: Abdomen is soft.      Tenderness: There is no abdominal tenderness.   Musculoskeletal:         General: No tenderness (With attempt to range of motion of left shoulder, immobilized in a brace).      Cervical back: Neck supple. No tenderness.   Skin:     General: Skin is warm.   Neurological:      General: No focal deficit present.      Mental Status: She is alert.          Assessment/Plan   Diagnoses and all orders for this visit:  Neuroendocrine tumor of liver  Chronic obstructive pulmonary disease, unspecified COPD type (Multi)  Closed nondisplaced comminuted fracture of shaft of left humerus with routine healing, subsequent encounter               Goals    None     Will continue PT and OT, pain management, fall precautions.  Follow-up with oncology and orthopedics